# Patient Record
Sex: FEMALE | Race: WHITE | NOT HISPANIC OR LATINO | Employment: FULL TIME | URBAN - METROPOLITAN AREA
[De-identification: names, ages, dates, MRNs, and addresses within clinical notes are randomized per-mention and may not be internally consistent; named-entity substitution may affect disease eponyms.]

---

## 2019-10-04 ENCOUNTER — APPOINTMENT (EMERGENCY)
Dept: RADIOLOGY | Facility: HOSPITAL | Age: 69
End: 2019-10-04
Payer: COMMERCIAL

## 2019-10-04 ENCOUNTER — HOSPITAL ENCOUNTER (EMERGENCY)
Facility: HOSPITAL | Age: 69
Discharge: HOME | End: 2019-10-04
Attending: EMERGENCY MEDICINE
Payer: COMMERCIAL

## 2019-10-04 VITALS
TEMPERATURE: 98.3 F | OXYGEN SATURATION: 97 % | RESPIRATION RATE: 16 BRPM | WEIGHT: 132.5 LBS | SYSTOLIC BLOOD PRESSURE: 167 MMHG | HEART RATE: 76 BPM | BODY MASS INDEX: 23.48 KG/M2 | DIASTOLIC BLOOD PRESSURE: 75 MMHG | HEIGHT: 63 IN

## 2019-10-04 DIAGNOSIS — S20.229A CONTUSION OF UPPER BACK: Primary | ICD-10-CM

## 2019-10-04 PROCEDURE — 99283 EMERGENCY DEPT VISIT LOW MDM: CPT

## 2019-10-04 PROCEDURE — 96372 THER/PROPH/DIAG INJ SC/IM: CPT

## 2019-10-04 PROCEDURE — 99284 EMERGENCY DEPT VISIT MOD MDM: CPT | Performed by: PHYSICIAN ASSISTANT

## 2019-10-04 PROCEDURE — 71101 X-RAY EXAM UNILAT RIBS/CHEST: CPT

## 2019-10-04 RX ORDER — LIDOCAINE 50 MG/G
1 PATCH TOPICAL ONCE
Status: DISCONTINUED | OUTPATIENT
Start: 2019-10-04 | End: 2019-10-04 | Stop reason: HOSPADM

## 2019-10-04 RX ORDER — IBANDRONATE SODIUM 150 MG/1
150 TABLET, FILM COATED ORAL
COMMUNITY

## 2019-10-04 RX ORDER — KETOROLAC TROMETHAMINE 30 MG/ML
15 INJECTION, SOLUTION INTRAMUSCULAR; INTRAVENOUS ONCE
Status: COMPLETED | OUTPATIENT
Start: 2019-10-04 | End: 2019-10-04

## 2019-10-04 RX ADMIN — LIDOCAINE 1 PATCH: 50 PATCH TOPICAL at 17:54

## 2019-10-04 RX ADMIN — KETOROLAC TROMETHAMINE 15 MG: 30 INJECTION, SOLUTION INTRAMUSCULAR at 17:54

## 2019-10-04 NOTE — DISCHARGE INSTRUCTIONS
Return to the Emergency Department sooner if increased pain, swelling, numbness, weakness, vomiting, difficulty breathing, redness

## 2019-10-04 NOTE — ED PROVIDER NOTES
History  Chief Complaint   Patient presents with    Fall     Pateint states she fell down 3-4 this morning while at home and is now c/o mid and lower back pain  Patient denies LOC and blood thinners  21-year-old female patient here for evaluation of upper back pain after fall down steps  States she was walking down a flight of steps when she slipped landing on her back striking the right upper thoracic area  She fell down about 3 steps before stopping herself  States she struck her head but has no headache visual disturbances nausea vomiting or dizziness, states was a minor impact on the head  States the only area now hurting her as the right upper back  Initially felt short of breath but is now improved  Does have increased pain with deep inspiration  If she rests her pain gets better, however any movement bending twisting included worsens the pain  Pain is not midline thoracic spine  No flank pain no chest pain or abdominal pain  No lower back pain  No extremity injuries   used: No    Fall   Mechanism of injury comment:  Trip down steps  Injury location: Right upper back  Incident location:  Home  Time since incident:  3 hours  Arrived directly from scene: no    Protective equipment: none    Suspicion of alcohol use: no    Suspicion of drug use: no    Tetanus status:  Unknown  Associated symptoms: back pain    Associated symptoms: no abdominal pain, no blindness, no chest pain, no difficulty breathing, no headaches, no hearing loss, no loss of consciousness, no nausea, no neck pain, no seizures and no vomiting    Risk factors: no anticoagulation therapy        Prior to Admission Medications   Prescriptions Last Dose Informant Patient Reported?  Taking?   ibandronate (BONIVA) 150 MG tablet   Yes Yes   Sig: Take 150 mg by mouth every 30 (thirty) days      Facility-Administered Medications: None       Past Medical History:   Diagnosis Date    Hyperlipidemia        Past Surgical History:   Procedure Laterality Date     SECTION         History reviewed  No pertinent family history  I have reviewed and agree with the history as documented  Social History     Tobacco Use    Smoking status: Never Smoker    Smokeless tobacco: Never Used   Substance Use Topics    Alcohol use: Never     Frequency: Never    Drug use: Not on file        Review of Systems   Constitutional: Negative for activity change, appetite change, chills, diaphoresis, fatigue, fever and unexpected weight change  HENT: Negative for congestion, hearing loss, rhinorrhea, sinus pressure, sore throat and trouble swallowing  Eyes: Negative for blindness, photophobia and visual disturbance  Respiratory: Negative for apnea, cough, choking, chest tightness, shortness of breath, wheezing and stridor  Cardiovascular: Negative for chest pain, palpitations and leg swelling  Gastrointestinal: Negative for abdominal distention, abdominal pain, blood in stool, constipation, diarrhea, nausea and vomiting  Genitourinary: Negative for decreased urine volume, difficulty urinating, dysuria, enuresis, flank pain, frequency, hematuria and urgency  Musculoskeletal: Positive for back pain  Negative for arthralgias, myalgias, neck pain and neck stiffness  Skin: Negative for color change, pallor, rash and wound  Allergic/Immunologic: Negative  Neurological: Negative for dizziness, tremors, seizures, loss of consciousness, syncope, weakness, light-headedness, numbness and headaches  Hematological: Negative  Psychiatric/Behavioral: Negative  All other systems reviewed and are negative  Physical Exam  Physical Exam   Constitutional: She is oriented to person, place, and time  She appears well-developed and well-nourished  Non-toxic appearance  She does not have a sickly appearance  She does not appear ill  No distress  HENT:   Head: Normocephalic and atraumatic     Eyes: Pupils are equal, round, and reactive to light  EOM and lids are normal    Neck: Normal range of motion  Neck supple  Cardiovascular: Normal rate, regular rhythm, S1 normal, S2 normal, normal heart sounds, intact distal pulses and normal pulses  Exam reveals no gallop, no distant heart sounds, no friction rub and no decreased pulses  No murmur heard  Pulses:       Radial pulses are 2+ on the right side, and 2+ on the left side  Pulmonary/Chest: Effort normal and breath sounds normal  No accessory muscle usage  No apnea, no tachypnea and no bradypnea  No respiratory distress  She has no decreased breath sounds  She has no wheezes  She has no rhonchi  She has no rales  Abdominal: Soft  Normal appearance  She exhibits no distension  There is no tenderness  There is no rigidity, no rebound and no guarding  Musculoskeletal: Normal range of motion  She exhibits no edema or deformity  Cervical back: She exhibits normal range of motion, no tenderness, no bony tenderness, no swelling, no edema, no deformity, no laceration and no pain  Thoracic back: She exhibits tenderness, bony tenderness and pain  She exhibits normal range of motion, no swelling, no edema, no deformity and no laceration  Back:    Neurological: She is alert and oriented to person, place, and time  No cranial nerve deficit  GCS eye subscore is 4  GCS verbal subscore is 5  GCS motor subscore is 6  Skin: Skin is warm, dry and intact  No rash noted  She is not diaphoretic  No erythema  No pallor  Psychiatric: Her speech is normal    Nursing note and vitals reviewed        Vital Signs  ED Triage Vitals [10/04/19 1530]   Temperature Pulse Respirations Blood Pressure SpO2   98 3 °F (36 8 °C) 82 18 157/70 98 %      Temp Source Heart Rate Source Patient Position - Orthostatic VS BP Location FiO2 (%)   Oral Monitor Sitting Left arm --      Pain Score       6           Vitals:    10/04/19 1530 10/04/19 1700 10/04/19 1730 10/04/19 1800   BP: 157/70 (!) 183/85 151/72 167/75   Pulse: 82 71 65 76   Patient Position - Orthostatic VS: Sitting   Sitting         Visual Acuity  Visual Acuity      Most Recent Value   L Pupil Size (mm)  3   R Pupil Size (mm)  3          ED Medications  Medications   lidocaine (LIDODERM) 5 % patch 1 patch (1 patch Topical Medication Applied 10/4/19 1754)   ketorolac (TORADOL) injection 15 mg (15 mg Intramuscular Given 10/4/19 1754)       Diagnostic Studies  Results Reviewed     None                 XR ribs right w pa chest min 3 views   ED Interpretation by Kati Cervantes PA-C (10/04 1838)   No acute cardiopulmonary abnormalities  Procedures  Procedures       ED Course                               MDM  Number of Diagnoses or Management Options  Contusion of upper back: new and requires workup  Diagnosis management comments: Differential diagnosis includes but is not limited to sprain, strain, fracture, contusion, pneumothorax, hemothorax or effusion  Plan:  X-ray chest and rib series  Dispo pending  Amount and/or Complexity of Data Reviewed  Tests in the radiology section of CPT®: ordered and reviewed  Independent visualization of images, tracings, or specimens: yes    Risk of Complications, Morbidity, and/or Mortality  Presenting problems: low  Management options: low  General comments: 53-year-old female with upper back pain after fall  Benign exam   X-ray shows no acute osseous abnormalities  No pneumothorax  Likely a contusion injury  Discussed conservative management which she agrees with  Her pain did improve after Lidoderm patch here  She is feeling better  She will follow up with her family physician, return if symptoms worsen      Patient Progress  Patient progress: stable      Disposition  Final diagnoses:   Contusion of upper back     Time reflects when diagnosis was documented in both MDM as applicable and the Disposition within this note     Time User Action Codes Description Comment    10/4/2019  6:39 PM Michael Braxton Add [S20 229A] Contusion of upper back       ED Disposition     ED Disposition Condition Date/Time Comment    Discharge Stable Fri Oct 4, 2019  6:39 PM [address] discharge to home/self care  Follow-up Information     Follow up With Specialties Details Why Contact Info Additional [address] [facility] Emergency Department Emergency Medicine Go to  If symptoms worsen 34 [facility] 1490 ED, [address] [place], [place], [address] [place] [address] Internal Medicine Call  for family doctor follow up as needed [address] [facility]  [phone]             Discharge Medication List as of 10/4/2019  6:40 PM      CONTINUE these medications which have NOT CHANGED    Details   ibandronate (BONIVA) 150 MG tablet Take 150 mg by mouth every 30 (thirty) days, Historical Med           No discharge procedures on file      ED Provider  Electronically Signed by           Shantal Bonilla PA-C  10/04/19 3777

## 2020-12-02 ENCOUNTER — OFFICE VISIT (OUTPATIENT)
Dept: OBGYN CLINIC | Facility: CLINIC | Age: 70
End: 2020-12-02
Payer: COMMERCIAL

## 2020-12-02 VITALS
HEIGHT: 63 IN | BODY MASS INDEX: 23.04 KG/M2 | DIASTOLIC BLOOD PRESSURE: 79 MMHG | SYSTOLIC BLOOD PRESSURE: 152 MMHG | WEIGHT: 130 LBS | HEART RATE: 73 BPM

## 2020-12-02 DIAGNOSIS — M22.2X1 PATELLOFEMORAL SYNDROME OF BOTH KNEES: Primary | ICD-10-CM

## 2020-12-02 DIAGNOSIS — R29.898 WEAKNESS OF BOTH HIPS: ICD-10-CM

## 2020-12-02 DIAGNOSIS — M62.9 HAMSTRING TIGHTNESS OF BOTH LOWER EXTREMITIES: ICD-10-CM

## 2020-12-02 DIAGNOSIS — M22.2X2 PATELLOFEMORAL SYNDROME OF BOTH KNEES: Primary | ICD-10-CM

## 2020-12-02 PROCEDURE — 99203 OFFICE O/P NEW LOW 30 MIN: CPT | Performed by: FAMILY MEDICINE

## 2020-12-02 RX ORDER — MELOXICAM 15 MG/1
15 TABLET ORAL DAILY
Qty: 30 TABLET | Refills: 1 | Status: SHIPPED | OUTPATIENT
Start: 2020-12-02

## 2020-12-15 ENCOUNTER — EVALUATION (OUTPATIENT)
Dept: PHYSICAL THERAPY | Facility: CLINIC | Age: 70
End: 2020-12-15
Payer: COMMERCIAL

## 2020-12-15 DIAGNOSIS — M22.2X1 PATELLOFEMORAL SYNDROME OF BOTH KNEES: ICD-10-CM

## 2020-12-15 DIAGNOSIS — M22.2X2 PATELLOFEMORAL SYNDROME OF BOTH KNEES: ICD-10-CM

## 2020-12-15 DIAGNOSIS — R29.898 WEAKNESS OF BOTH HIPS: ICD-10-CM

## 2020-12-15 DIAGNOSIS — M62.9 HAMSTRING TIGHTNESS OF BOTH LOWER EXTREMITIES: ICD-10-CM

## 2020-12-15 PROCEDURE — 97161 PT EVAL LOW COMPLEX 20 MIN: CPT | Performed by: PHYSICAL THERAPIST

## 2020-12-15 PROCEDURE — 97116 GAIT TRAINING THERAPY: CPT | Performed by: PHYSICAL THERAPIST

## 2020-12-15 PROCEDURE — 97110 THERAPEUTIC EXERCISES: CPT | Performed by: PHYSICAL THERAPIST

## 2020-12-22 ENCOUNTER — OFFICE VISIT (OUTPATIENT)
Dept: PHYSICAL THERAPY | Facility: CLINIC | Age: 70
End: 2020-12-22
Payer: COMMERCIAL

## 2020-12-22 DIAGNOSIS — R29.898 WEAKNESS OF BOTH HIPS: ICD-10-CM

## 2020-12-22 DIAGNOSIS — M22.2X1 PATELLOFEMORAL SYNDROME OF BOTH KNEES: Primary | ICD-10-CM

## 2020-12-22 DIAGNOSIS — M22.2X2 PATELLOFEMORAL SYNDROME OF BOTH KNEES: Primary | ICD-10-CM

## 2020-12-22 PROCEDURE — 97112 NEUROMUSCULAR REEDUCATION: CPT | Performed by: PHYSICAL THERAPIST

## 2020-12-22 PROCEDURE — 97110 THERAPEUTIC EXERCISES: CPT | Performed by: PHYSICAL THERAPIST

## 2020-12-29 ENCOUNTER — OFFICE VISIT (OUTPATIENT)
Dept: PHYSICAL THERAPY | Facility: CLINIC | Age: 70
End: 2020-12-29
Payer: COMMERCIAL

## 2020-12-29 DIAGNOSIS — M22.2X2 PATELLOFEMORAL SYNDROME OF BOTH KNEES: Primary | ICD-10-CM

## 2020-12-29 DIAGNOSIS — M22.2X1 PATELLOFEMORAL SYNDROME OF BOTH KNEES: Primary | ICD-10-CM

## 2020-12-29 DIAGNOSIS — M62.9 HAMSTRING TIGHTNESS OF BOTH LOWER EXTREMITIES: ICD-10-CM

## 2020-12-29 DIAGNOSIS — R29.898 WEAKNESS OF BOTH HIPS: ICD-10-CM

## 2020-12-29 PROCEDURE — 97112 NEUROMUSCULAR REEDUCATION: CPT

## 2020-12-29 PROCEDURE — 97110 THERAPEUTIC EXERCISES: CPT

## 2021-01-05 ENCOUNTER — OFFICE VISIT (OUTPATIENT)
Dept: PHYSICAL THERAPY | Facility: CLINIC | Age: 71
End: 2021-01-05
Payer: COMMERCIAL

## 2021-01-05 DIAGNOSIS — R29.898 WEAKNESS OF BOTH HIPS: ICD-10-CM

## 2021-01-05 DIAGNOSIS — M62.9 HAMSTRING TIGHTNESS OF BOTH LOWER EXTREMITIES: ICD-10-CM

## 2021-01-05 DIAGNOSIS — M22.2X2 PATELLOFEMORAL SYNDROME OF BOTH KNEES: Primary | ICD-10-CM

## 2021-01-05 DIAGNOSIS — M22.2X1 PATELLOFEMORAL SYNDROME OF BOTH KNEES: Primary | ICD-10-CM

## 2021-01-05 PROCEDURE — 97110 THERAPEUTIC EXERCISES: CPT

## 2021-01-05 PROCEDURE — 97112 NEUROMUSCULAR REEDUCATION: CPT

## 2021-01-05 NOTE — PROGRESS NOTES
Daily Note     Today's date: 2021  Patient name: Jimmy Funez  : 1950  MRN: [id number]  Referring provider: Karine Barton DO  Dx:   Encounter Diagnosis     ICD-10-CM    1  Patellofemoral syndrome of both knees  M22 2X1     M22 2X2    2  Weakness of both hips  R29 898    3  Hamstring tightness of both lower extremities  M62 9                   Subjective: Pt reports minimal pain in b/l knees today  She continues to have irritation with ascending/descending stairs  Objective: See treatment diary below      Assessment: Visual/tactile cues to avoid b/l knee valgus ascending/descneding stairs as well as with sit to stands and less pain present with correct technique  Added box steps for HEP and added lateral step ups to begin working in frontal plane, no increases in pain with either exercise  Close supervision to CG needed during resisted walking to ensure pt safety  Plan: Continue with current POC to address pt deficits        Precautions: none      Manuals 12/15 12/22 12/29 1/5                                                             Neuro Re-Ed             karyn   15' Resisted walking 3x ea fw, bw, sdwys 5#         stepups fwed, side  6" 20x 6" 20x 6" 20xfwd b/l/20x sdwys         Box steps    HEP 10x ea          Side step/bckwd  HEP  3x                                                  Ther Ex             bike  10' 10' 10'         elliptical             Circuit legs VMO  20x 20x 20x VMO only         CS/HR  10x 10" 10x 10"x10                                                HEP 15'            Ther Activity             lifting             Sit to stand    10x w/cues         Gait Training             TM 8' 5' 5'                       Modalities

## 2021-01-12 ENCOUNTER — OFFICE VISIT (OUTPATIENT)
Dept: PHYSICAL THERAPY | Facility: CLINIC | Age: 71
End: 2021-01-12
Payer: COMMERCIAL

## 2021-01-12 DIAGNOSIS — M62.9 HAMSTRING TIGHTNESS OF BOTH LOWER EXTREMITIES: ICD-10-CM

## 2021-01-12 DIAGNOSIS — R29.898 WEAKNESS OF BOTH HIPS: ICD-10-CM

## 2021-01-12 DIAGNOSIS — M22.2X2 PATELLOFEMORAL SYNDROME OF BOTH KNEES: Primary | ICD-10-CM

## 2021-01-12 DIAGNOSIS — M22.2X1 PATELLOFEMORAL SYNDROME OF BOTH KNEES: Primary | ICD-10-CM

## 2021-01-12 PROCEDURE — 97112 NEUROMUSCULAR REEDUCATION: CPT

## 2021-01-12 PROCEDURE — 97110 THERAPEUTIC EXERCISES: CPT

## 2021-01-12 NOTE — PROGRESS NOTES
Daily Note     Today's date: 2021  Patient name: Reba Nathan  : 1950  MRN: [id number]  Referring provider: Rachel Garcia DO  Dx:   Encounter Diagnosis     ICD-10-CM    1  Patellofemoral syndrome of both knees  M22 2X1     M22 2X2    2  Weakness of both hips  R29 898    3  Hamstring tightness of both lower extremities  M62 9                   Subjective: Pt reports having some increased pain in b/l knees during work today as she was walking and standing for longer periods of time, that has since subsided  Objective: See treatment diary below      Assessment: Progressed hip 3-way to standing with VMO emphasis with during flexion and mini squats to work on hip/glute strength deficits; demonstrated correct technique and will continue performing for HEP  Continues to need visual cues to avoid b/l knee valgus with sit to stands but does improve with cueing  Less stability present with L side lateral walking on karyn, CS needed for all resisted walking to ensure safety  Plan: Continue with current POC to address pt deficits        Precautions: none      Manuals 12/15 12/22 12/29 1/5 1/12                                                            6"Neuro Re-Ed             karyn   15' Resisted walking 3x ea fw, bw, sdwys 5# Resisted walking 3x ea fw, bw, sdwys 5#        stepups fwed, side  6" 20x 6" 20x 6" 20xfwd b/l/20x sdwys 6" 20x fwd b/l, 20x sdwys        Box steps    HEP 10x ea          Side step/bckwd  HEP  3x                                                  Ther Ex             bike  10' 10' 10' 10'        elliptical             Circuit legs VMO  20x 20x 20x VMO only 20x VMO only        CS/HR  10x 10" 10x 10"x10 10"x10        Standing hip 3-way HEP     2x10 ea b/l                                  HEP 15'            Ther Activity             lifting             Sit to stand    10x w/cues 15x        Gait Training             TM 8' 5' 5'                       Modalities

## 2021-01-19 ENCOUNTER — OFFICE VISIT (OUTPATIENT)
Dept: PHYSICAL THERAPY | Facility: CLINIC | Age: 71
End: 2021-01-19
Payer: COMMERCIAL

## 2021-01-19 DIAGNOSIS — M22.2X1 PATELLOFEMORAL SYNDROME OF BOTH KNEES: Primary | ICD-10-CM

## 2021-01-19 DIAGNOSIS — R29.898 WEAKNESS OF BOTH HIPS: ICD-10-CM

## 2021-01-19 DIAGNOSIS — M62.9 HAMSTRING TIGHTNESS OF BOTH LOWER EXTREMITIES: ICD-10-CM

## 2021-01-19 DIAGNOSIS — M22.2X2 PATELLOFEMORAL SYNDROME OF BOTH KNEES: Primary | ICD-10-CM

## 2021-01-19 PROCEDURE — 97112 NEUROMUSCULAR REEDUCATION: CPT

## 2021-01-19 PROCEDURE — 97110 THERAPEUTIC EXERCISES: CPT

## 2021-01-19 NOTE — PROGRESS NOTES
Daily Note     Today's date: 2021  Patient name: Marilu Castro  : 1950  MRN: [id number]  Referring provider: Eneida Kang DO  Dx:   Encounter Diagnosis     ICD-10-CM    1  Patellofemoral syndrome of both knees  M22 2X1     M22 2X2    2  Weakness of both hips  R29 898    3  Hamstring tightness of both lower extremities  M62 9                   Subjective: Pt reports that she is doing okay today with no current complaints of pain  Notes when she is standing at work for prolonged hours she really notices increased pain  Objective: See treatment diary below      Assessment: Pt demonstrated knee valgus with sit to stand xfers today  She was able to progress all exercises as charted below feeling fatigued but good overall  Educated pt on DOMs and importance of proper form with compliance present  Plan: Continue with current POC to address pt deficits        Precautions: none      Manuals 12/15 12/22 12/29 1/5 1/12 1/19                                                           6"Neuro Re-Ed             karyn   15' Resisted walking 3x ea fw, bw, sdwys 5# Resisted walking 3x ea fw, bw, sdwys 5# Resisted walking 3x ea fw, bw, sdwys 5#       stepups fwed, side  6" 20x 6" 20x 6" 20xfwd b/l/20x sdwys 6" 20x fwd b/l, 20x sdwys 6" 20x fwd b/l, 20x sdwys       Box steps    HEP 10x ea          Side step/bckwd  HEP  3x                                                  Ther Ex             bike  10' 10' 10' 10' 10'       elliptical             Circuit legs VMO  20x 20x 20x VMO only 20x VMO only 20x VMO only       CS/HR  10x 10" 10x 10"x10 10"x10 10"x10       Standing hip 3-way HEP     2x10 ea b/l 2x10 ea                                 HEP 15'            Ther Activity             lifting             Sit to stand    10x w/cues 15x 15x rtb       Gait Training             TM 8' 5' 5'                       Modalities

## 2021-01-26 ENCOUNTER — APPOINTMENT (OUTPATIENT)
Dept: PHYSICAL THERAPY | Facility: CLINIC | Age: 71
End: 2021-01-26
Payer: COMMERCIAL

## 2021-01-26 DIAGNOSIS — M22.2X1 PATELLOFEMORAL SYNDROME OF BOTH KNEES: ICD-10-CM

## 2021-01-26 DIAGNOSIS — M22.2X2 PATELLOFEMORAL SYNDROME OF BOTH KNEES: ICD-10-CM

## 2021-01-27 ENCOUNTER — EVALUATION (OUTPATIENT)
Dept: PHYSICAL THERAPY | Facility: CLINIC | Age: 71
End: 2021-01-27
Payer: COMMERCIAL

## 2021-01-27 DIAGNOSIS — M22.2X1 PATELLOFEMORAL SYNDROME OF BOTH KNEES: Primary | ICD-10-CM

## 2021-01-27 DIAGNOSIS — M62.9 HAMSTRING TIGHTNESS OF BOTH LOWER EXTREMITIES: ICD-10-CM

## 2021-01-27 DIAGNOSIS — R29.898 WEAKNESS OF BOTH HIPS: ICD-10-CM

## 2021-01-27 DIAGNOSIS — M22.2X2 PATELLOFEMORAL SYNDROME OF BOTH KNEES: Primary | ICD-10-CM

## 2021-01-27 PROCEDURE — 97112 NEUROMUSCULAR REEDUCATION: CPT

## 2021-01-27 PROCEDURE — 97110 THERAPEUTIC EXERCISES: CPT

## 2021-01-27 NOTE — PROGRESS NOTES
Daily Note/Discharge Note    Today's date: 2021  Patient name: Renea Eisenmenger  : 1950        MRN: [id number]  Referring provider: Govind Bolanos DO  Dx:   Encounter Diagnosis     ICD-10-CM    1  Patellofemoral syndrome of both knees  M22 2X1     M22 2X2    2  Weakness of both hips  R29 898    3  Hamstring tightness of both lower extremities  M62 9        Start Time: 1155  Stop Time: 1235  Total time in clinic (min): 40 minutes     *Discharge goals done by MPT, GT 2021    Subjective: Patient stated she has no pain in the knees, compliant w/ HEP  Objective: See treatment diary below      Assessment: Patient tolerated session well  No increased pain  Provided HEP to patient w/ RTB including STS  Increased reps on circuit w/o difficulty  Good endurance  Good control w/ resisted walking, no LOB  Goals  3 weeks  Decrease knee pain by 26%--MET  Increase MMT strength of hips by  5 grade--MET  6 weeks  No knee pain with sit to stand--MET  Able to go up and down stairs without pain  --MET    Plan: Discharge from PT as per primary PT due to meeting all goals        Precautions: none      Manuals 12/15 12/22 12/29 1/5 1/12 1/19 1/27                                                          6"Neuro Re-Ed             karyn   15' Resisted walking 3x ea fw, bw, sdwys 5# Resisted walking 3x ea fw, bw, sdwys 5# Resisted walking 3x ea fw, bw, sdwys 5# Resisted walking 10x fw/bw/sdwys 5#      stepups fwed, side  6" 20x 6" 20x 6" 20xfwd b/l/20x sdwys 6" 20x fwd b/l, 20x sdwys 6" 20x fwd b/l, 20x sdwys 6" 20x fwd, b/l 20x sdways      Box steps    HEP 10x ea          Side step/bckwd  HEP  3x                                                  Ther Ex             bike  10' 10' 10' 10' 10' 10'      elliptical             Circuit legs VMO  20x 20x 20x VMO only 20x VMO only 20x VMO only 30x      CS/HR  10x 10" 10x 10"x10 10"x10 10"x10 10" 10x      Standing hip 3-way HEP     2x10 ea b/l 2x10 ea HEP HEP 15'            Ther Activity             lifting             Sit to stand    10x w/cues 15x 15x rtb 20x RTB      Gait Training             TM 8' 5' 5'                       Modalities

## 2021-02-15 ENCOUNTER — OFFICE VISIT (OUTPATIENT)
Dept: OBGYN CLINIC | Facility: CLINIC | Age: 71
End: 2021-02-15
Payer: COMMERCIAL

## 2021-02-15 VITALS
HEART RATE: 87 BPM | DIASTOLIC BLOOD PRESSURE: 73 MMHG | WEIGHT: 132.8 LBS | HEIGHT: 63 IN | SYSTOLIC BLOOD PRESSURE: 133 MMHG | BODY MASS INDEX: 23.53 KG/M2

## 2021-02-15 DIAGNOSIS — M22.2X2 PATELLOFEMORAL SYNDROME OF BOTH KNEES: Primary | ICD-10-CM

## 2021-02-15 DIAGNOSIS — M22.2X1 PATELLOFEMORAL SYNDROME OF BOTH KNEES: Primary | ICD-10-CM

## 2021-02-15 PROCEDURE — 99213 OFFICE O/P EST LOW 20 MIN: CPT | Performed by: FAMILY MEDICINE

## 2021-02-15 RX ORDER — PROPRANOLOL/HYDROCHLOROTHIAZID 40 MG-25MG
TABLET ORAL
COMMUNITY

## 2021-02-15 NOTE — PATIENT INSTRUCTIONS
Contact your healthcare provider if:   · You have sharp pain during exercise or at rest     · You have questions or concerns about stretches or exercises  Decrease pain and swelling:   · Apply ice  on your hip, knee, or thigh for 15 to 20 minutes every hour or as directed  Use an ice pack, or put crushed ice in a plastic bag  Cover it with a towel  Ice helps prevent tissue damage and decreases swelling and pain  · Apply heat  on your hip, knee, or thigh for 20 to 30 minutes before you stretch or exercise  Use a heat pack or wet a washcloth and heat it for 15 seconds in the microwave  Heat helps decrease pain and makes it easier to stretch your muscles  · Massage painful areas as directed  Use a foam roller to gently massage your painful areas  Place the foam roller on a flat surface  Lie on your side with the foam roller against your painful leg  Move your body so that it rolls up and down from your hip to above your knee  Do not lie with it against the outside of your knee cap  Exercise safety:  Do not start an exercise program before you talk to your healthcare provider  You may need to wait until your swelling and pain have gone down before you start to exercise  · Move slowly and smoothly  Avoid fast or jerky motions  This will help prevent another injury  · Breathe normally  Do not hold your breath  It is important to breathe in and out so you do not tense up during exercise  Tension could prevent you from moving your joint in a full range of motion  · Do the exercises and stretches on both legs  Do this so both ITBs remain strong and flexible  · Stop if you feel sharp pain or an increase in pain  Stop the exercise and contact your healthcare provider if you have these symptoms  It is normal to feel some discomfort, such as a dull ache, during exercise  Regular exercise will help decrease your discomfort over time  · Warm up before you stretch and exercise    This will help prevent an injury  Walk or ride a stationary bike for 5 to 10 minutes  How to perform ITB stretches:  Always stretch before and after you do strengthening exercises  Hold each stretch for 30 seconds to 1 minute  Repeat each stretch 2 to 3 times or as directed  · Standing ITB stretch:  Stand with your injured leg behind your other leg  Cross your front leg over your injured leg  Bend sideways toward the hip that is not injured  Stop when you feel a stretch in the hip of your injured leg  Repeat on the other side  · Lying ITB stretch:  Lie on your back  Bend the knee of your injured leg toward your chest  Place your hand on the outside of your thigh  Slowly pull your knee across your body  Stop when you feel a stretch in your hip and outside of your thigh  Repeat on the other side  · Hip stretch:  Lie on the ground  Place both hands on the shin of 1 leg  Pull your knee toward your chest  Repeat on the other side  · Standing quadriceps stretch:  Stand and place one hand against a wall or hold the back of a chair for balance  With your weight on one leg, bend your other leg and grab your ankle  Pull your heel toward your buttocks  · Sitting hamstring stretch:  Sit with both legs straight in front of you  Place your palms on the floor and slide your hands forward until you feel the stretch  If possible, grab your toes  Do not round your back  How to perform ITB strengthening exercises: Your healthcare provider will tell you how often to do the following exercises:  · Standing half squats:  Stand with your feet shoulder-width apart  Lean your back against a wall or hold the back of a chair for balance, if needed  Slowly sit down about 10 inches, as if you are going to sit in a chair  Put most of your weight in your heels  Hold the squat for 5 seconds, then slowly rise to a standing position  Do 3 sets of 10 squats           · Sitting leg lifts:  Sit in a chair with both feet flat on the floor  Slowly straighten and raise one leg  Squeeze your thigh muscles and hold for 5 seconds  Relax and return your foot to the floor  Do 3 sets of 10 lifts on each leg  · Single leg dips:  Stand on your injured leg, between 2 chairs  The backs of the chairs should be toward you  Put 1 hand on each chair  Straighten your leg that is not injured and lift it off the floor  Use the chairs to hold some of your weight  Bend the knee of your injured leg  Slowly lower your body toward the floor a few inches  Your weight should be in your heel  Hold for 5 seconds  Slowly return to a standing position  Do 3 sets of 10 on each leg  · Standing hamstring curls: Face a wall and place both palms flat on the wall  Instead you can hold the back of a chair for balance  With your weight on 1 leg, lift your other foot as close to your buttocks as you can  Hold for 5 seconds and then lower your leg  Do 3 sets of 10 curls on each leg  · Straight leg lifts:  Lie on your stomach with straight legs  Fold your arms in front of you and rest your head in your arms  Tighten your leg muscles and raise one leg as high as you can  Hold for 5 seconds, then lower your leg  Do 2 sets of 10 lifts on each leg to strengthen your buttocks  · Hip adduction:  Lie on your injured side  Cross your top leg over your injured leg  Put your foot on the floor in front of you  Raise your injured leg until it touches the other leg  Slowly lower the leg to the floor  Do 3 sets of 10 on each leg  · Hip abduction:  Lie on your side that is not injured  Straighten your legs  Slowly raise your injured leg as high as you can  Keep your foot pointing straight  Hold for 5 seconds then slowly lower your leg  Do 3 sets of 10 on each leg  Follow up with your healthcare provider as directed:  Write down your questions so you remember to ask them during your visits     © Copyright [employer] 2020 Information is for End User's use only and may not be sold, redistributed or otherwise used for commercial purposes  All illustrations and images included in CareNotes® are the copyrighted property of [employer]  or Khurram Gallardo  The above information is an  only  It is not intended as medical advice for individual conditions or treatments  Talk to your doctor, nurse or pharmacist before following any medical regimen to see if it is safe and effective for you

## 2021-02-15 NOTE — PROGRESS NOTES
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  Discussed with patient physical exam, impression and plan  Physical exam is unremarkable for bony or soft tissue tenderness of the knees  She demonstrates full extension of the knees  There is no pain with patellar inhibition and grind  Clinical impression is that she is improved regard to patellofemoral mechanics with a recommend she continue with home exercise program and taking supplements  She will follow up as needed  Subjective:   Patient ID: Noreen Mejia is a 69 y o  female  Chief Complaint   Patient presents with    Left Knee - Follow-up    Right Knee - Follow-up         65-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 of more than 4 months duration  She was last seen by me more than 2 months ago at which point clinical impression was patellofemoral syndrome  She was prescribed meloxicam 15 mg once daily, referred to physical therapy, and advised on taking supplements  She reports significant improvement since her last visit  She completed physical therapy and has been continuing with home exercise program and also taking supplements  She has no longer taking meloxicam   She has not needed to take any Tylenol or other over-the-counter medication for pain  She reports having pain of the left knee described as localized to the anterior aspect, achy and sore, mild intensity, worse when rising from seated position  After prolonged sitting, and abates after 5 minutes    He denies any pain of the knees with going up and down stairs  Knee Pain  This is a new problem  The current episode started more than 1 month ago  The problem occurs intermittently  The problem has been rapidly improving  Associated symptoms include arthralgias  Pertinent negatives include no joint swelling, numbness or weakness  Exacerbated by: Rising from prolonged sitting  She has tried rest and NSAIDs (Physical therapy, home exercise, supplements) for the symptoms  The treatment provided significant relief  Review of Systems   Musculoskeletal: Positive for arthralgias  Negative for joint swelling  Neurological: Negative for weakness and numbness  Objective:  Vitals:    02/15/21 1505   BP: 133/73   Pulse: 87   Weight: 60 2 kg (132 lb 12 8 oz)   Height: 5' 3" (1 6 m)     Right Knee Exam     Muscle Strength   The patient has normal right knee strength  Tenderness   The patient is experiencing no tenderness  Range of Motion   Extension: normal     Comments:   Negative patellar inhibition and grind      Left Knee Exam     Muscle Strength   The patient has normal left knee strength  Tenderness   The patient is experiencing no tenderness  Range of Motion   Extension: normal     Comments:    Negative patellar inhibition and grind      Right Hip Exam     Comments:    Hamstring tightness      Left Hip Exam     Comments:   Hamstring tightness            Physical Exam  Vitals signs and nursing note reviewed  Constitutional:       General: She is not in acute distress  Appearance: She is well-developed  HENT:      Head: Normocephalic  Right Ear: External ear normal       Left Ear: External ear normal    Eyes:      Conjunctiva/sclera: Conjunctivae normal    Neck:      Trachea: No tracheal deviation  Cardiovascular:      Rate and Rhythm: Normal rate  Pulmonary:      Effort: Pulmonary effort is normal  No respiratory distress  Abdominal:      General: There is no distension     Musculoskeletal:         General: No tenderness  Skin:     General: Skin is warm and dry  Neurological:      Mental Status: She is alert and oriented to person, place, and time     Psychiatric:         Behavior: Behavior normal

## 2021-03-10 DIAGNOSIS — Z23 ENCOUNTER FOR IMMUNIZATION: ICD-10-CM

## 2021-06-19 RX ORDER — MELOXICAM 15 MG/1
TABLET ORAL
Qty: 30 TABLET | Refills: 1 | OUTPATIENT
Start: 2021-06-19